# Patient Record
Sex: FEMALE | Race: WHITE | NOT HISPANIC OR LATINO | Employment: OTHER | URBAN - METROPOLITAN AREA
[De-identification: names, ages, dates, MRNs, and addresses within clinical notes are randomized per-mention and may not be internally consistent; named-entity substitution may affect disease eponyms.]

---

## 2017-01-03 NOTE — PATIENT DISCUSSION
1.  Pseudophakia OU - IOLs stable. Monitor. 2. Dry Eye OU:  Continue current management with Artificial Tears. 3.  Return for an appointment in 6 weeks for post op exam. MRx. with Dr. Radha Archuleta.

## 2017-02-15 NOTE — PATIENT DISCUSSION
Post-Op Cataract Surgery 15-90 days Both Eyes (OU)-  Doing well with stable vision. mild dry eye tears prn blinkReturn for an appointment in 6 months for comprehensive exam. with Dr. Heber Rogers.

## 2018-06-27 NOTE — PATIENT DISCUSSION
Dermatochalasis and brow ptosis OU: Discussed an option od direct brow lift ( with insurance coverage due to visual significance) with optional (out of pocket blepharoplasty).

## 2018-11-07 ENCOUNTER — ESTABLISHED COMPREHENSIVE EXAM (OUTPATIENT)
Dept: URBAN - METROPOLITAN AREA CLINIC 35 | Facility: CLINIC | Age: 53
End: 2018-11-07

## 2018-11-07 DIAGNOSIS — H52.203: ICD-10-CM

## 2018-11-07 DIAGNOSIS — H52.01: ICD-10-CM

## 2018-11-07 PROCEDURE — 92015 DETERMINE REFRACTIVE STATE: CPT

## 2018-11-07 PROCEDURE — 92014 COMPRE OPH EXAM EST PT 1/>: CPT

## 2018-11-07 ASSESSMENT — KERATOMETRY
OD_K1POWER_DIOPTERS: 44.25
OS_K1POWER_DIOPTERS: 45.25
OD_K2POWER_DIOPTERS: 43.50
OS_K2POWER_DIOPTERS: 44.50

## 2018-11-07 ASSESSMENT — VISUAL ACUITY
OD_CC: J1
OD_SC: 20/30-1
OS_CC: J2
OS_SC: 20/40

## 2018-11-07 ASSESSMENT — TONOMETRY
OD_IOP_MMHG: 13
OS_IOP_MMHG: 13

## 2019-10-21 ENCOUNTER — ESTABLISHED COMPREHENSIVE EXAM (OUTPATIENT)
Dept: URBAN - METROPOLITAN AREA CLINIC 35 | Facility: CLINIC | Age: 54
End: 2019-10-21

## 2019-10-21 DIAGNOSIS — H52.01: ICD-10-CM

## 2019-10-21 PROCEDURE — 92014 COMPRE OPH EXAM EST PT 1/>: CPT

## 2019-10-21 PROCEDURE — 92015 DETERMINE REFRACTIVE STATE: CPT

## 2019-10-21 ASSESSMENT — VISUAL ACUITY
OS_CC: 20/25
OD_CC: 20/30-2
OD_CC: J3
OS_CC: J2

## 2019-10-21 ASSESSMENT — KERATOMETRY
OD_K2POWER_DIOPTERS: 43.50
OS_K2POWER_DIOPTERS: 44.50
OD_K1POWER_DIOPTERS: 44.25
OS_K1POWER_DIOPTERS: 45.25

## 2019-10-21 ASSESSMENT — TONOMETRY
OD_IOP_MMHG: 12
OS_IOP_MMHG: 12

## 2020-10-26 ENCOUNTER — ESTABLISHED COMPREHENSIVE EXAM (OUTPATIENT)
Dept: URBAN - METROPOLITAN AREA CLINIC 35 | Facility: CLINIC | Age: 55
End: 2020-10-26

## 2020-10-26 DIAGNOSIS — H52.203: ICD-10-CM

## 2020-10-26 DIAGNOSIS — H52.03: ICD-10-CM

## 2020-10-26 PROCEDURE — 92014 COMPRE OPH EXAM EST PT 1/>: CPT

## 2020-10-26 PROCEDURE — 92015 DETERMINE REFRACTIVE STATE: CPT

## 2020-10-26 ASSESSMENT — VISUAL ACUITY
OD_CC: 20/25+2
OD_CC: J1
OS_CC: 20/25
OS_CC: J1

## 2020-10-26 ASSESSMENT — KERATOMETRY
OD_K2POWER_DIOPTERS: 45
OD_K1POWER_DIOPTERS: 44.25
OS_K1POWER_DIOPTERS: 43.75
OS_K2POWER_DIOPTERS: 44.5

## 2020-10-26 ASSESSMENT — TONOMETRY
OS_IOP_MMHG: 11
OD_IOP_MMHG: 11

## 2021-09-13 ENCOUNTER — RX CHANGE - NO APPT (OUTPATIENT)
Dept: URBAN - METROPOLITAN AREA CLINIC 35 | Facility: CLINIC | Age: 56
End: 2021-09-13

## 2021-09-13 ASSESSMENT — KERATOMETRY
OS_K1POWER_DIOPTERS: 43.75
OS_K2POWER_DIOPTERS: 44.5
OD_K2POWER_DIOPTERS: 45
OD_K1POWER_DIOPTERS: 44.25

## 2021-10-04 ENCOUNTER — ESTABLISHED COMPREHENSIVE EXAM (OUTPATIENT)
Dept: URBAN - METROPOLITAN AREA CLINIC 35 | Facility: CLINIC | Age: 56
End: 2021-10-04

## 2021-10-04 DIAGNOSIS — H52.03: ICD-10-CM

## 2021-10-04 PROCEDURE — 92014 COMPRE OPH EXAM EST PT 1/>: CPT

## 2021-10-04 PROCEDURE — 92015 DETERMINE REFRACTIVE STATE: CPT

## 2021-10-04 ASSESSMENT — VISUAL ACUITY
OS_CC: J3
OD_PH: 20/20-1
OD_CC: 20/40
OD_CC: J1
OS_CC: 20/20

## 2021-10-04 ASSESSMENT — TONOMETRY
OD_IOP_MMHG: 12
OS_IOP_MMHG: 12

## 2021-10-04 ASSESSMENT — KERATOMETRY
OS_K1POWER_DIOPTERS: 43.75
OD_K1POWER_DIOPTERS: 44.25
OS_K2POWER_DIOPTERS: 44.5
OD_K2POWER_DIOPTERS: 45

## 2022-10-18 ENCOUNTER — COMPREHENSIVE EXAM (OUTPATIENT)
Dept: URBAN - METROPOLITAN AREA CLINIC 35 | Facility: CLINIC | Age: 57
End: 2022-10-18

## 2022-10-18 DIAGNOSIS — H52.03: ICD-10-CM

## 2022-10-18 PROCEDURE — 92015 DETERMINE REFRACTIVE STATE: CPT

## 2022-10-18 PROCEDURE — 92014 COMPRE OPH EXAM EST PT 1/>: CPT

## 2022-10-18 ASSESSMENT — KERATOMETRY
OS_AXISANGLE2_DEGREES: 80
OD_K2POWER_DIOPTERS: 45.25
OS_AXISANGLE_DEGREES: 170
OD_AXISANGLE_DEGREES: 005
OD_AXISANGLE2_DEGREES: 95
OD_K1POWER_DIOPTERS: 44.25
OS_K2POWER_DIOPTERS: 44.75
OS_K1POWER_DIOPTERS: 43.75

## 2022-10-18 ASSESSMENT — VISUAL ACUITY
OU_CC: J1-
OS_CC: J1
OD_CC: J2
OD_CC: 20/20-1
OU_CC: 20/20-
OS_CC: 20/20-2

## 2022-10-18 ASSESSMENT — TONOMETRY
OD_IOP_MMHG: 12
OS_IOP_MMHG: 12

## 2023-10-09 ENCOUNTER — COMPREHENSIVE EXAM (OUTPATIENT)
Dept: URBAN - METROPOLITAN AREA CLINIC 35 | Facility: CLINIC | Age: 58
End: 2023-10-09

## 2023-10-09 DIAGNOSIS — H52.03: ICD-10-CM

## 2023-10-09 PROCEDURE — 92015 DETERMINE REFRACTIVE STATE: CPT

## 2023-10-09 PROCEDURE — 92014 COMPRE OPH EXAM EST PT 1/>: CPT

## 2023-10-09 ASSESSMENT — VISUAL ACUITY
OD_CC: J6 FUZZY
OD_BAT: 20/25
OU_CC: 20/20-2
OD_CC: 20/25
OU_CC: J1
OS_CC: J3 FUZZY
OS_BAT: 20/25
OS_CC: 20/25-1

## 2023-10-09 ASSESSMENT — TONOMETRY
OD_IOP_MMHG: 15
OS_IOP_MMHG: 15

## 2024-09-09 ENCOUNTER — COMPREHENSIVE EXAM (OUTPATIENT)
Dept: URBAN - METROPOLITAN AREA CLINIC 35 | Facility: CLINIC | Age: 59
End: 2024-09-09

## 2024-09-09 DIAGNOSIS — H52.03: ICD-10-CM

## 2024-09-09 PROCEDURE — 92014 COMPRE OPH EXAM EST PT 1/>: CPT

## 2024-09-09 PROCEDURE — 92015 DETERMINE REFRACTIVE STATE: CPT

## 2025-08-26 ENCOUNTER — PREPPED CHART (OUTPATIENT)
Age: 60
End: 2025-08-26